# Patient Record
Sex: MALE | NOT HISPANIC OR LATINO | Employment: FULL TIME | URBAN - METROPOLITAN AREA
[De-identification: names, ages, dates, MRNs, and addresses within clinical notes are randomized per-mention and may not be internally consistent; named-entity substitution may affect disease eponyms.]

---

## 2017-01-17 ENCOUNTER — TELEPHONE (OUTPATIENT)
Dept: WOUND CARE | Facility: CLINIC | Age: 47
End: 2017-01-17

## 2017-01-17 NOTE — TELEPHONE ENCOUNTER
Spoke with pt, Okay to order belt through [employer] . Belt is working well , getting 4 days out of the pouch now   He will call [employer] for another belt. No need for another rx  Dawn Burt RN CWON    ----- Message from Gabrielle Mackay RN sent at 1/17/2017 12:15 PM CST -----  Regarding: hernia belt  Yang is calling to see if he can get another hernia belt for back up?  The one he's using now fits perfect.  Does he need another Rx?    [phone] OK to leave a message.    Thanks, Sultana

## 2017-05-02 ENCOUNTER — TELEPHONE (OUTPATIENT)
Dept: GASTROENTEROLOGY | Facility: CLINIC | Age: 47
End: 2017-05-02

## 2017-05-08 ENCOUNTER — OFFICE VISIT (OUTPATIENT)
Dept: GASTROENTEROLOGY | Facility: CLINIC | Age: 47
End: 2017-05-08

## 2017-05-08 ENCOUNTER — TELEPHONE (OUTPATIENT)
Dept: GASTROENTEROLOGY | Facility: CLINIC | Age: 47
End: 2017-05-08

## 2017-05-08 VITALS
DIASTOLIC BLOOD PRESSURE: 84 MMHG | SYSTOLIC BLOOD PRESSURE: 127 MMHG | WEIGHT: 160 LBS | HEART RATE: 78 BPM | OXYGEN SATURATION: 99 % | BODY MASS INDEX: 24.25 KG/M2 | HEIGHT: 68 IN

## 2017-05-08 DIAGNOSIS — K90.829 SHORT GUT SYNDROME: Primary | ICD-10-CM

## 2017-05-08 DIAGNOSIS — E55.9 VITAMIN D DEFICIENCY: ICD-10-CM

## 2017-05-08 DIAGNOSIS — E55.9 VITAMIN D DEFICIENCY: Primary | ICD-10-CM

## 2017-05-08 DIAGNOSIS — R79.89 ABNORMAL LIVER FUNCTION TEST: ICD-10-CM

## 2017-05-08 LAB
ALBUMIN SERPL-MCNC: 3.9 G/DL (ref 3.4–5)
ALP SERPL-CCNC: 174 U/L (ref 40–150)
ALT SERPL W P-5'-P-CCNC: 60 U/L (ref 0–70)
AST SERPL W P-5'-P-CCNC: 64 U/L (ref 0–45)
BILIRUB DIRECT SERPL-MCNC: 0.1 MG/DL (ref 0–0.2)
BILIRUB SERPL-MCNC: 0.5 MG/DL (ref 0.2–1.3)
DEPRECATED CALCIDIOL+CALCIFEROL SERPL-MC: 18 UG/L (ref 20–75)
PROT SERPL-MCNC: 8.2 G/DL (ref 6.8–8.8)

## 2017-05-08 RX ORDER — ERGOCALCIFEROL 1.25 MG/1
50000 CAPSULE, LIQUID FILLED ORAL
Qty: 12 CAPSULE | Refills: 0 | Status: SHIPPED | OUTPATIENT
Start: 2017-05-08 | End: 2017-06-27

## 2017-05-08 ASSESSMENT — PAIN SCALES - GENERAL: PAINLEVEL: NO PAIN (0)

## 2017-05-08 NOTE — NURSING NOTE
Printed after visit summary given to pt along with verbal instructions.   Pt will follow up as needed. Pt sent to the lab.

## 2017-05-08 NOTE — NURSING NOTE
"Chief Complaint   Patient presents with     RECHECK     Denies C/O       Vitals:    05/08/17 0955   BP: 127/84   Pulse: 78   SpO2: 99%   Weight: 72.6 kg (160 lb)   Height: 1.727 m (5' 8\")       Body mass index is 24.33 kg/(m^2).                            "
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
## 2017-05-08 NOTE — MR AVS SNAPSHOT
After Visit Summary   5/8/2017    Yang Coffman    MRN: [id number]           Patient Information     Date Of Birth          1970        Visit Information        Provider Department      5/8/2017 10:00 AM Case Blake MD [facility] Gastroenterology and IBD        Care Instructions    1. Keep up the great work!    2. Try to increase the fiber supplement to 2-3 times per day    3. Continue the imodium    4. Get some labs today  Lab tests today at the 1st floor -- you will be notified of results by letter or my chart message in 7-10 days.  You will receive a phone call if more urgent follow up is needed.      5. Recommend 2-3 times per year having your PCP check kidney function, electrolytes, liver tests    Follow up as needed.  For questions regarding your care Monday through Friday, contact the RN GI care coordinator,  Call Romelia Camacho at  [phone] option 3 and leave a voicemail. Your call will be  returned same day, or if consultation is needed with the provider, it may be following business day - or you may send a My Chart message.    For medication refills (prescribed by the GI clinic), contact your pharmacy.    For appointment rescheduling/cancellation, contact [phone]     After hours, or if you have an immediate GI concern and cannot wait for a return call, contact the GI Fellow at [phone] and select option #4.    Romelia Camacho          Follow-ups after your visit        Who to contact     Please call your clinic at [phone] to:    Ask questions about your health    Make or cancel appointments    Discuss your medicines    Learn about your test results    Speak to your doctor   If you have compliments or concerns about an experience at your clinic, or if you wish to file a complaint, please contact [facility] Patient Relations at [phone] or email us at [email or web address]         Additional Information About Your  "Visit        MyChart Information     listedplaces gives you secure access to your electronic health record. If you see a primary care provider, you can also send messages to your care team and make appointments. If you have questions, please call your primary care clinic.  If you do not have a primary care provider, please call [phone] and they will assist you.      listedplaces is an electronic gateway that provides easy, online access to your medical records. With listedplaces, you can request a clinic appointment, read your test results, renew a prescription or communicate with your care team.     To access your existing account, please contact your [facility] or call [phone] for assistance.        Care EveryWhere ID     This is your Care EveryWhere ID. This could be used by other organizations to access your [facility] medical records  [id number]        Your Vitals Were     Pulse Height Pulse Oximetry BMI (Body Mass Index)          78 1.727 m (5' 8\") 99% 24.33 kg/m2         Blood Pressure from Last 3 Encounters:   05/08/17 127/84   12/06/16 127/81   11/15/16 133/82    Weight from Last 3 Encounters:   05/08/17 72.6 kg (160 lb)   12/06/16 70.9 kg (156 lb 6.4 oz)   11/15/16 71 kg (156 lb 8 oz)              Today, you had the following     No orders found for display       Primary Care Provider Office Phone # Fax #    Jaguar MORALES Dayna [phone]       XXX NO INFO FOUND XXX [address] [facility] [address]        Thank you!     Thank you for choosing [facility]STRO[facility] AND IBD  for your care. Our goal is always to provide you with excellent care. Hearing back from our patients is one way we can continue to improve our services. Please take a few minutes to complete the written survey that you may receive in the mail after your visit with us. Thank you!             Your Updated Medication List - Protect others around you: Learn how to safely use, store and " "throw away your medicines at www.disposemymeds.org.          This list is accurate as of: 5/8/17 10:25 AM.  Always use your most recent med list.                   Brand Name Dispense Instructions for use    cholestyramine 4 G Packet    QUESTRAN     Take 1 packet by mouth 3 times daily (with meals)       cyanocobalamin 1000 MCG/ML injection    VITAMIN B12     Inject 1,000 mcg into the muscle       FAMOTIDINE PO      Take 20 mg by mouth daily       ferrous sulfate 325 (65 FE) MG tablet    IRON     Take by mouth daily (with breakfast)       loperamide 2 MG capsule    IMODIUM     Take 2 mg by mouth 3 times daily       order for DME     1 each    Equipment being ordered: [employer] hernia belt Pt waist is 36 medium,  Large abd hernia 7 cm high  stoma ring 2 5/8\"    6446- SP - ring 2 5/8\" opening  Placed 5/8 from top       VITAMIN D (CHOLECALCIFEROL) PO      Take by mouth daily         "

## 2017-05-09 ENCOUNTER — CARE COORDINATION (OUTPATIENT)
Dept: GASTROENTEROLOGY | Facility: CLINIC | Age: 47
End: 2017-05-09

## 2017-05-09 NOTE — PROGRESS NOTES
Called and left the message that Dr. Blake had ordered vitamin d booster to be taken weekly for 12 weeks then go to 2000 units daily and then have rechecked in a couple of months by primary care.  Left my name and number if pt had any further questions.       Yang's vit D is low again. I sent booster for 12 weeks to his pharmacy. He can take 2000 units daily while on this and then after. His PCP can check a vitamin D level in a couple months to make sure better. Not surprising vit D is still low given short gut and it is spring in [place].

## 2020-03-10 ENCOUNTER — HEALTH MAINTENANCE LETTER (OUTPATIENT)
Age: 50
End: 2020-03-10

## 2020-12-27 ENCOUNTER — HEALTH MAINTENANCE LETTER (OUTPATIENT)
Age: 50
End: 2020-12-27

## 2021-04-24 ENCOUNTER — HEALTH MAINTENANCE LETTER (OUTPATIENT)
Age: 51
End: 2021-04-24

## 2021-10-09 ENCOUNTER — HEALTH MAINTENANCE LETTER (OUTPATIENT)
Age: 51
End: 2021-10-09

## 2022-04-11 VITALS
TEMPERATURE: 99 F | BODY MASS INDEX: 25.09 KG/M2 | DIASTOLIC BLOOD PRESSURE: 85 MMHG | RESPIRATION RATE: 18 BRPM | HEART RATE: 92 BPM | SYSTOLIC BLOOD PRESSURE: 124 MMHG | OXYGEN SATURATION: 97 % | WEIGHT: 165 LBS

## 2022-04-11 NOTE — ED TRIAGE NOTES
Sent from  for VQ scan. Pt has sharp mid right back pain since Friday. Was better yesterday then came back today.

## 2022-04-12 ENCOUNTER — APPOINTMENT (OUTPATIENT)
Dept: GENERAL RADIOLOGY | Facility: CLINIC | Age: 52
End: 2022-04-12
Attending: EMERGENCY MEDICINE
Payer: COMMERCIAL

## 2022-04-12 ENCOUNTER — APPOINTMENT (OUTPATIENT)
Dept: CT IMAGING | Facility: CLINIC | Age: 52
End: 2022-04-12
Attending: EMERGENCY MEDICINE
Payer: COMMERCIAL

## 2022-04-12 ENCOUNTER — APPOINTMENT (OUTPATIENT)
Dept: NUCLEAR MEDICINE | Facility: CLINIC | Age: 52
End: 2022-04-12
Attending: EMERGENCY MEDICINE
Payer: COMMERCIAL

## 2022-04-12 ENCOUNTER — HOSPITAL ENCOUNTER (EMERGENCY)
Facility: CLINIC | Age: 52
Discharge: HOME | End: 2022-04-12
Attending: EMERGENCY MEDICINE | Admitting: EMERGENCY MEDICINE
Payer: COMMERCIAL

## 2022-04-12 ENCOUNTER — HOSPITAL ENCOUNTER (EMERGENCY)
Facility: CLINIC | Age: 52
Discharge: LEFT BEFORE DISCHARGE | End: 2022-04-12
Payer: COMMERCIAL

## 2022-04-12 VITALS
SYSTOLIC BLOOD PRESSURE: 115 MMHG | BODY MASS INDEX: 25.9 KG/M2 | HEART RATE: 84 BPM | WEIGHT: 165 LBS | HEIGHT: 67 IN | OXYGEN SATURATION: 95 % | TEMPERATURE: 99.3 F | RESPIRATION RATE: 23 BRPM | DIASTOLIC BLOOD PRESSURE: 79 MMHG

## 2022-04-12 DIAGNOSIS — I26.99 OTHER ACUTE PULMONARY EMBOLISM WITHOUT ACUTE COR PULMONALE (H): ICD-10-CM

## 2022-04-12 DIAGNOSIS — R07.81 PLEURITIC PAIN: ICD-10-CM

## 2022-04-12 LAB
ALBUMIN UR-MCNC: 30 MG/DL
ANION GAP SERPL CALCULATED.3IONS-SCNC: 9 MMOL/L (ref 3–14)
APPEARANCE UR: CLEAR
ATRIAL RATE - MUSE: 83 BPM
BASOPHILS # BLD AUTO: 0 10E3/UL (ref 0–0.2)
BASOPHILS NFR BLD AUTO: 0 %
BILIRUB UR QL STRIP: NEGATIVE
BUN SERPL-MCNC: 32 MG/DL (ref 7–30)
CALCIUM SERPL-MCNC: 8.4 MG/DL (ref 8.5–10.1)
CHLORIDE BLD-SCNC: 105 MMOL/L (ref 94–109)
CO2 SERPL-SCNC: 19 MMOL/L (ref 20–32)
COLOR UR AUTO: ABNORMAL
CREAT SERPL-MCNC: 2.28 MG/DL (ref 0.66–1.25)
D DIMER PPP FEU-MCNC: 5.35 UG/ML FEU (ref 0–0.5)
DIASTOLIC BLOOD PRESSURE - MUSE: NORMAL MMHG
EOSINOPHIL # BLD AUTO: 0.2 10E3/UL (ref 0–0.7)
EOSINOPHIL NFR BLD AUTO: 3 %
ERYTHROCYTE [DISTWIDTH] IN BLOOD BY AUTOMATED COUNT: 12.5 % (ref 10–15)
GFR SERPL CREATININE-BSD FRML MDRD: 34 ML/MIN/1.73M2
GLUCOSE BLD-MCNC: 106 MG/DL (ref 70–99)
GLUCOSE UR STRIP-MCNC: NEGATIVE MG/DL
GRANULAR CAST: 5 /LPF
HCT VFR BLD AUTO: 39.1 % (ref 40–53)
HGB BLD-MCNC: 13.1 G/DL (ref 13.3–17.7)
HGB UR QL STRIP: ABNORMAL
HYALINE CASTS: 1 /LPF
IMM GRANULOCYTES # BLD: 0.1 10E3/UL
IMM GRANULOCYTES NFR BLD: 1 %
INTERPRETATION ECG - MUSE: NORMAL
KETONES UR STRIP-MCNC: NEGATIVE MG/DL
LEUKOCYTE ESTERASE UR QL STRIP: NEGATIVE
LYMPHOCYTES # BLD AUTO: 1 10E3/UL (ref 0.8–5.3)
LYMPHOCYTES NFR BLD AUTO: 11 %
MCH RBC QN AUTO: 31 PG (ref 26.5–33)
MCHC RBC AUTO-ENTMCNC: 33.5 G/DL (ref 31.5–36.5)
MCV RBC AUTO: 93 FL (ref 78–100)
MONOCYTES # BLD AUTO: 1 10E3/UL (ref 0–1.3)
MONOCYTES NFR BLD AUTO: 11 %
MUCOUS THREADS #/AREA URNS LPF: PRESENT /LPF
NEUTROPHILS # BLD AUTO: 6.6 10E3/UL (ref 1.6–8.3)
NEUTROPHILS NFR BLD AUTO: 74 %
NITRATE UR QL: NEGATIVE
NRBC # BLD AUTO: 0 10E3/UL
NRBC BLD AUTO-RTO: 0 /100
NT-PROBNP SERPL-MCNC: 12 PG/ML (ref 0–900)
P AXIS - MUSE: 59 DEGREES
PH UR STRIP: 5.5 [PH] (ref 5–7)
PLATELET # BLD AUTO: 281 10E3/UL (ref 150–450)
POTASSIUM BLD-SCNC: 4.1 MMOL/L (ref 3.4–5.3)
PR INTERVAL - MUSE: 136 MS
QRS DURATION - MUSE: 76 MS
QT - MUSE: 354 MS
QTC - MUSE: 415 MS
R AXIS - MUSE: 36 DEGREES
RADIOLOGIST FLAGS: ABNORMAL
RBC # BLD AUTO: 4.22 10E6/UL (ref 4.4–5.9)
RBC URINE: <1 /HPF
SODIUM SERPL-SCNC: 133 MMOL/L (ref 133–144)
SP GR UR STRIP: 1.01 (ref 1–1.03)
SYSTOLIC BLOOD PRESSURE - MUSE: NORMAL MMHG
T AXIS - MUSE: 60 DEGREES
TROPONIN I SERPL HS-MCNC: <3 NG/L
UROBILINOGEN UR STRIP-MCNC: NORMAL MG/DL
VENTRICULAR RATE- MUSE: 83 BPM
WBC # BLD AUTO: 9 10E3/UL (ref 4–11)
WBC URINE: 0 /HPF

## 2022-04-12 PROCEDURE — 250N000011 HC RX IP 250 OP 636: Performed by: EMERGENCY MEDICINE

## 2022-04-12 PROCEDURE — 81001 URINALYSIS AUTO W/SCOPE: CPT | Performed by: EMERGENCY MEDICINE

## 2022-04-12 PROCEDURE — 80048 BASIC METABOLIC PNL TOTAL CA: CPT | Performed by: EMERGENCY MEDICINE

## 2022-04-12 PROCEDURE — 96374 THER/PROPH/DIAG INJ IV PUSH: CPT | Mod: 59

## 2022-04-12 PROCEDURE — 343N000001 HC RX 343: Performed by: EMERGENCY MEDICINE

## 2022-04-12 PROCEDURE — 93005 ELECTROCARDIOGRAM TRACING: CPT

## 2022-04-12 PROCEDURE — A9540 TC99M MAA: HCPCS | Performed by: EMERGENCY MEDICINE

## 2022-04-12 PROCEDURE — 85379 FIBRIN DEGRADATION QUANT: CPT | Performed by: EMERGENCY MEDICINE

## 2022-04-12 PROCEDURE — 84484 ASSAY OF TROPONIN QUANT: CPT | Performed by: EMERGENCY MEDICINE

## 2022-04-12 PROCEDURE — 74176 CT ABD & PELVIS W/O CONTRAST: CPT

## 2022-04-12 PROCEDURE — 85025 COMPLETE CBC W/AUTO DIFF WBC: CPT | Performed by: EMERGENCY MEDICINE

## 2022-04-12 PROCEDURE — 71046 X-RAY EXAM CHEST 2 VIEWS: CPT

## 2022-04-12 PROCEDURE — 36415 COLL VENOUS BLD VENIPUNCTURE: CPT | Performed by: EMERGENCY MEDICINE

## 2022-04-12 PROCEDURE — 250N000009 HC RX 250: Performed by: EMERGENCY MEDICINE

## 2022-04-12 PROCEDURE — 258N000003 HC RX IP 258 OP 636: Performed by: EMERGENCY MEDICINE

## 2022-04-12 PROCEDURE — 78580 LUNG PERFUSION IMAGING: CPT

## 2022-04-12 PROCEDURE — 83880 ASSAY OF NATRIURETIC PEPTIDE: CPT | Performed by: EMERGENCY MEDICINE

## 2022-04-12 PROCEDURE — 99285 EMERGENCY DEPT VISIT HI MDM: CPT | Mod: 25

## 2022-04-12 PROCEDURE — 71275 CT ANGIOGRAPHY CHEST: CPT

## 2022-04-12 RX ORDER — APIXABAN 5 MG (74)
KIT ORAL
Qty: 74 EACH | Refills: 0 | Status: SHIPPED | OUTPATIENT
Start: 2022-04-12 | End: 2022-05-12

## 2022-04-12 RX ORDER — MORPHINE SULFATE 4 MG/ML
4 INJECTION, SOLUTION INTRAMUSCULAR; INTRAVENOUS
Status: DISCONTINUED | OUTPATIENT
Start: 2022-04-12 | End: 2022-04-12 | Stop reason: HOSPADM

## 2022-04-12 RX ORDER — IOPAMIDOL 755 MG/ML
64 INJECTION, SOLUTION INTRAVASCULAR ONCE
Status: COMPLETED | OUTPATIENT
Start: 2022-04-12 | End: 2022-04-12

## 2022-04-12 RX ADMIN — KIT FOR THE PREPARATION OF TECHNETIUM TC 99M ALBUMIN AGGREGATED 3.5 MILLICURIE: 2.5 INJECTION, POWDER, FOR SOLUTION INTRAVENOUS at 10:30

## 2022-04-12 RX ADMIN — SODIUM CHLORIDE 1000 ML: 9 INJECTION, SOLUTION INTRAVENOUS at 04:40

## 2022-04-12 RX ADMIN — SODIUM CHLORIDE 89 ML: 900 INJECTION INTRAVENOUS at 12:13

## 2022-04-12 RX ADMIN — IOPAMIDOL 64 ML: 755 INJECTION, SOLUTION INTRAVENOUS at 12:13

## 2022-04-12 RX ADMIN — MORPHINE SULFATE 4 MG: 4 INJECTION, SOLUTION INTRAMUSCULAR; INTRAVENOUS at 05:13

## 2022-04-12 NOTE — DISCHARGE INSTRUCTIONS
Please follow-up with hematology and your primary care provider.  Please take blood thinners as prescribed.  Return to the ER if having worsening trouble breathing, shortness of breath, chest pain, weakness or falls or other concerns.  With any blood thinner there is a risk of bleeding look for black or tarry stools, blood in your stools, etc.  If you fall and hit your head you must come to the ER for a head CT as you are at a higher risk for possible bleeding in the brain.

## 2022-04-12 NOTE — CONSULTS
Patient has [employer] through an employer.    Xarelto/Eliquis:  $20/mo.     Enoxaparin 80mg x 10 syringes: $0.    Jantoven (warfarin): $8/mo.      DEL Whitten, Pharmacy Technician/Liaison, Discharge Pharmacy, [phone]

## 2022-04-12 NOTE — ED NOTES
4/12/2022  11:57 AM    Results:  CT Chest Pulmonary Embolism w Contrast   Final Result   Abnormal   IMPRESSION:   1.  Positive study for pulmonary emboli to the right lower lobe.   2.  Mild right lower lobe atelectasis and/or infiltrate.      [Critical Result: Pulmonary embolism]      Finding was identified on 4/12/2022 12:52 PM.       Dr. Pollard was contacted by me at 4/12/2022 1:06 PM and verbalized   understanding of the critical finding.       TAYLOR DODD MD            SYSTEM ID:  [id number]      NM Lung Scan Perfusion Particulate   Final Result   IMPRESSION:       Findings could represent a small burden of pulmonary embolism involving the left lower lobe.      Abd/pelvis CT no contrast - Stone Protocol   Final Result   IMPRESSION:    1.  No renal, ureteral, or bladder stones.   2.  Right lower lobe infiltrate.      TAYLOR DODD MD            SYSTEM ID:  [id number]      XR Chest 2 Views   Final Result   IMPRESSION: There is a mild thoracolumbar spinal curvature with mild hypertrophic changes in the lower thoracic spine. Minimal fine fibrosis/atelectasis seen in both lung bases. The chest is otherwise negative with no acute cardiopulmonary abnormalities    identified.        Interventions:  Medications   morphine (PF) injection 4 mg (4 mg Intravenous Given 4/12/22 0513)   0.9% sodium chloride BOLUS (0 mLs Intravenous Stopped 4/12/22 0510)   technetium pertechnetate with albumin (Tc99m MAA) radioisotope injection 3 millicurie (3.5 millicuries Intravenous Given 4/12/22 1030)   iopamidol (ISOVUE-370) solution 64 mL (64 mLs Intravenous Given 4/12/22 1213)   Saline Flush (89 mLs Intravenous Given 4/12/22 1213)       ED course:  1145 I spoke with Dr. Chaudhary from Radiology  to discuss the patient's findings, presentation, and plan of care.  1153 I rechecked the patient.  1321 Informed of Radiology of CT scan results.  1401 I rechecked the patient.      Care of this patient was signed out to me from Dr. Morrissey.  My impression is ***    Diagnosis    ICD-10-CM    1. Pleuritic pain  R07.81        Scribe Disclosure:  I, Valentin Hired, am serving as a scribe at 12:03 PM on 4/12/2022 to document services personally performed by Kyra Pradhan MD based on my observations and the provider's statements to me.

## 2022-04-12 NOTE — ED PROVIDER NOTES
I received sign out from Dr. Morrissey.  Please refer to their H&P for further information.  In brief, patient presents with right-sided pleuritic chest pain.  He noticed fluid achy sensation and pain with deep inspiration about 2 days ago.  He presented to the urgent care for evaluation and was found to have elevated D-dimer.  Due to elevated D-dimer was sent to the ER for further scanning.  Patient was signed out to me pending VQ scan and CT of the abdomen.  CT of the abdomen shows possible right lower lobe infiltrate but no other acute pathology such as stone.  Labs from previous provider including EKG, troponin are reassuring.  Kidney function chronic kidney disease with GFR 34.  VQ scan was indeterminant showing a possible left-sided PE.  I spoke with radiology,  who reviewed the scan with me.  After lengthy discussion with him we discussed CT scan for further clarification.  Given that patient's GFR is greater than 30 with a history of chronic kidney disease radiology feels that the scan would be appropriate.  No contraindications at this time.  I discussed this with patient who is agreeable.  CT scan did confirm PE in the peripheral right lower lobe.  BMP was added on and normal.  No evidence of right heart strain.  We ambulated the patient without hypoxia or significant tachycardia.  Pasi score class II low risk.  Discussed inpatient versus outpatient management.  He opts to do outpatient management.  Pharmacy consulted and discussed with patient optimal anticoagulation.  Patient was started on Eliquis.  Discussed importance of PCP follow-up and hematology follow-up for unprovoked PE.  Patient was discharged.  Return precautions provided.     Kyra Pradhan MD  04/12/22 3361
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
## 2022-04-28 ENCOUNTER — TELEPHONE (OUTPATIENT)
Dept: HEMATOLOGY | Facility: CLINIC | Age: 52
End: 2022-04-28
Payer: COMMERCIAL

## 2022-05-16 ENCOUNTER — HEALTH MAINTENANCE LETTER (OUTPATIENT)
Age: 52
End: 2022-05-16

## 2022-09-11 ENCOUNTER — HEALTH MAINTENANCE LETTER (OUTPATIENT)
Age: 52
End: 2022-09-11

## 2023-06-03 ENCOUNTER — HEALTH MAINTENANCE LETTER (OUTPATIENT)
Age: 53
End: 2023-06-03

## 2024-07-13 ENCOUNTER — HEALTH MAINTENANCE LETTER (OUTPATIENT)
Age: 54
End: 2024-07-13